# Patient Record
Sex: OTHER/UNKNOWN | URBAN - METROPOLITAN AREA
[De-identification: names, ages, dates, MRNs, and addresses within clinical notes are randomized per-mention and may not be internally consistent; named-entity substitution may affect disease eponyms.]

---

## 2017-02-15 ENCOUNTER — APPOINTMENT (OUTPATIENT)
Dept: URBAN - METROPOLITAN AREA CLINIC 290 | Age: 27
Setting detail: DERMATOLOGY
End: 2017-03-28

## 2017-02-15 DIAGNOSIS — L72.0 EPIDERMAL CYST: ICD-10-CM

## 2017-02-15 PROCEDURE — OTHER COUNSELING: OTHER

## 2017-02-15 PROCEDURE — OTHER DEFER: OTHER

## 2017-02-15 PROCEDURE — 99202 OFFICE O/P NEW SF 15 MIN: CPT

## 2017-02-15 ASSESSMENT — LOCATION DETAILED DESCRIPTION DERM: LOCATION DETAILED: RIGHT SUPERIOR MEDIAL MALAR CHEEK

## 2017-02-15 ASSESSMENT — LOCATION SIMPLE DESCRIPTION DERM: LOCATION SIMPLE: RIGHT CHEEK

## 2017-02-15 ASSESSMENT — LOCATION ZONE DERM: LOCATION ZONE: FACE

## 2017-02-15 NOTE — PROCEDURE: DEFER
Procedure To Be Performed At Next Visit: Excision
Instructions (Optional): Scheduled excision for 3/13/17 @ 8:30am\\nDr joaquin is aware of surgery specifics
Detail Level: Detailed